# Patient Record
Sex: FEMALE | Race: OTHER | Employment: UNEMPLOYED | URBAN - METROPOLITAN AREA
[De-identification: names, ages, dates, MRNs, and addresses within clinical notes are randomized per-mention and may not be internally consistent; named-entity substitution may affect disease eponyms.]

---

## 2017-04-27 ENCOUNTER — HOSPITAL ENCOUNTER (EMERGENCY)
Facility: HOSPITAL | Age: 52
Discharge: HOME | End: 2017-04-27
Attending: EMERGENCY MEDICINE
Payer: COMMERCIAL

## 2017-04-27 VITALS
BODY MASS INDEX: 32.98 KG/M2 | WEIGHT: 168 LBS | TEMPERATURE: 98 F | HEIGHT: 60 IN | SYSTOLIC BLOOD PRESSURE: 137 MMHG | RESPIRATION RATE: 18 BRPM | HEART RATE: 90 BPM | DIASTOLIC BLOOD PRESSURE: 89 MMHG | OXYGEN SATURATION: 99 %

## 2017-04-27 DIAGNOSIS — J30.9 ALLERGIC RHINITIS, UNSPECIFIED ALLERGIC RHINITIS TRIGGER, UNSPECIFIED RHINITIS SEASONALITY: ICD-10-CM

## 2017-04-27 DIAGNOSIS — R04.0 EPISTAXIS: Primary | ICD-10-CM

## 2017-04-27 PROCEDURE — 99283 EMERGENCY DEPT VISIT LOW MDM: CPT

## 2017-04-27 RX ORDER — CETIRIZINE HYDROCHLORIDE 10 MG/1
10 TABLET ORAL DAILY
Qty: 30 TABLET | Refills: 0 | Status: SHIPPED | OUTPATIENT
Start: 2017-04-27 | End: 2017-05-27

## 2017-04-27 RX ORDER — FLUTICASONE PROPIONATE 50 MCG
2 SPRAY, SUSPENSION (ML) NASAL DAILY
Qty: 16 G | Refills: 0 | Status: SHIPPED | OUTPATIENT
Start: 2017-04-27 | End: 2017-05-27

## 2017-04-27 NOTE — ED NOTES
Pt given discharge instructions, prescriptions, and follow up.  Questions answered and pt verbalized understanding

## 2017-04-27 NOTE — ED PROVIDER NOTES
Patient Seen in: [facility] [place] AND [facility] Emergency Department    History   Patient presents with:  Nose Bleed (nasopharyngeal)    Stated Complaint: nose bleed    HPI    79-year-old female with history of hypertension that is controlled who takes no anticoagula atraumatic. Eyes: Conjunctivae are normal. Pupils are equal, round, and reactive to light. ENT: Clogged nares bilaterally with blood. The patient did blow repeatedly on gauze and blew out multiple clots from both sides.   After using a Q-tip to wipe aw visit in 1 day      MD Yann Lao [address] [place] [address]  [phone]    Schedule an appointment as soon as possible for a visit in 4 days        Medications Prescribed:  Current Discharge Medication List    START takin

## 2017-12-12 ENCOUNTER — HOSPITAL ENCOUNTER (OUTPATIENT)
Dept: ULTRASOUND IMAGING | Facility: HOSPITAL | Age: 52
Discharge: HOME | End: 2017-12-12
Attending: SURGERY
Payer: COMMERCIAL

## 2017-12-12 DIAGNOSIS — K82.9 DISEASE OF GALLBLADDER: ICD-10-CM

## 2017-12-12 DIAGNOSIS — R10.10 UPPER ABDOMINAL PAIN: ICD-10-CM

## 2017-12-12 PROCEDURE — 76705 ECHO EXAM OF ABDOMEN: CPT | Performed by: SURGERY

## 2017-12-12 PROCEDURE — 76700 US EXAM ABDOM COMPLETE: CPT | Performed by: SURGERY

## 2017-12-20 ENCOUNTER — HOSPITAL ENCOUNTER (OUTPATIENT)
Dept: MRI IMAGING | Age: 52
Discharge: HOME | End: 2017-12-20
Attending: FAMILY MEDICINE
Payer: COMMERCIAL

## 2017-12-20 DIAGNOSIS — H53.2 DOUBLE VISION: ICD-10-CM

## 2017-12-20 DIAGNOSIS — R51.9 HEADACHE: ICD-10-CM

## 2017-12-20 DIAGNOSIS — H53.8 BLURRED VISION: ICD-10-CM

## 2017-12-20 PROCEDURE — 70544 MR ANGIOGRAPHY HEAD W/O DYE: CPT | Performed by: FAMILY MEDICINE

## 2017-12-21 ENCOUNTER — HOSPITAL ENCOUNTER (OUTPATIENT)
Dept: NUCLEAR MEDICINE | Facility: HOSPITAL | Age: 52
Discharge: HOME | End: 2017-12-21
Attending: SURGERY
Payer: COMMERCIAL

## 2017-12-21 DIAGNOSIS — R10.10 UPPER ABDOMINAL PAIN: ICD-10-CM

## 2017-12-21 PROCEDURE — 78227 HEPATOBIL SYST IMAGE W/DRUG: CPT | Performed by: SURGERY

## 2018-09-11 ENCOUNTER — HOSPITAL ENCOUNTER (OUTPATIENT)
Dept: GENERAL RADIOLOGY | Age: 53
Discharge: HOME | End: 2018-09-11
Attending: FAMILY MEDICINE
Payer: COMMERCIAL

## 2018-09-11 DIAGNOSIS — Z01.818 PREOP TESTING: ICD-10-CM

## 2018-09-11 PROCEDURE — 71046 X-RAY EXAM CHEST 2 VIEWS: CPT | Performed by: FAMILY MEDICINE

## 2019-09-25 ENCOUNTER — HOSPITAL ENCOUNTER (OUTPATIENT)
Dept: GENERAL RADIOLOGY | Age: 54
Discharge: HOME | End: 2019-09-25
Attending: INTERNAL MEDICINE
Payer: COMMERCIAL

## 2019-09-25 DIAGNOSIS — M54.2 CERVICAL PAIN: ICD-10-CM

## 2019-09-25 DIAGNOSIS — M54.6 THORACIC BACK PAIN, UNSPECIFIED BACK PAIN LATERALITY, UNSPECIFIED CHRONICITY: ICD-10-CM

## 2019-09-25 DIAGNOSIS — M25.511 RIGHT SHOULDER PAIN, UNSPECIFIED CHRONICITY: ICD-10-CM

## 2019-09-25 DIAGNOSIS — M54.50 LUMBAR SPINE PAIN: ICD-10-CM

## 2019-09-25 PROCEDURE — 73030 X-RAY EXAM OF SHOULDER: CPT | Performed by: INTERNAL MEDICINE

## 2019-09-25 PROCEDURE — 72072 X-RAY EXAM THORAC SPINE 3VWS: CPT | Performed by: INTERNAL MEDICINE

## 2019-09-25 PROCEDURE — 72110 X-RAY EXAM L-2 SPINE 4/>VWS: CPT | Performed by: INTERNAL MEDICINE

## 2019-09-25 PROCEDURE — 72050 X-RAY EXAM NECK SPINE 4/5VWS: CPT | Performed by: INTERNAL MEDICINE

## 2021-12-27 ENCOUNTER — HOSPITAL ENCOUNTER (OUTPATIENT)
Dept: ULTRASOUND IMAGING | Facility: HOSPITAL | Age: 56
Discharge: HOME | End: 2021-12-27
Attending: INTERNAL MEDICINE
Payer: COMMERCIAL

## 2021-12-27 DIAGNOSIS — R10.2 PAIN IN PELVIS: ICD-10-CM

## 2021-12-27 PROCEDURE — 76856 US EXAM PELVIC COMPLETE: CPT | Performed by: INTERNAL MEDICINE

## 2021-12-27 PROCEDURE — 76830 TRANSVAGINAL US NON-OB: CPT | Performed by: INTERNAL MEDICINE

## (undated) NOTE — ED AVS SNAPSHOT
[facility] Emergency Department    [address]     [place] [address]    Phone:  [phone]    Fax:  [address]   MRN: [id number]    Department:  [facility] Emergency Department   Date of Visit:  4/27/ USE THE NEOSYNEPHRINE NASAL SPRAY (2 SPRAYS) IN THE RIGHT NOSTRIL AND APPLY THE CLAMP FOR 20 MINUTES IF YOU BLEED AGAIN. RETURN IF YOU CANNOT STOP BLEEDING.     Discharge References/Attachments     [employer] (ADULT) (ENGLISH)      Disclosure     Insuranc IF THERE IS ANY CHANGE OR WORSENING OF YOUR CONDITION, CALL YOUR PRIMARY CARE PHYSICIAN AT ONCE OR RETURN IMMEDIATELY TO THE EMERGENCY DEPARTMENT.     If you have been prescribed any medication(s), please fill your prescription right away and begin taking - If you have concerns related to behavioral health issues or thoughts of harming yourself, contact [facility] at [phone].     - If you don’t have insurance, Antoni Chan has partnered with [facility]

## (undated) NOTE — ED AVS SNAPSHOT
[facility] Emergency Department    [address]     [place] [address]    Phone:  [phone]    Fax:  [address]   MRN: [id number]    Department:  [facility] Emergency Department   Date of Visit:  4/27/ and Class Registration line at [phone] or find a doctor online by visiting [email or web address].    IF THERE IS ANY CHANGE OR WORSENING OF YOUR CONDITION, CALL YOUR PRIMARY CARE PHYSICIAN AT ONCE OR RETURN IMMEDIATELY TO [address] [place] [address].     If